# Patient Record
Sex: FEMALE | Race: WHITE | NOT HISPANIC OR LATINO | URBAN - METROPOLITAN AREA
[De-identification: names, ages, dates, MRNs, and addresses within clinical notes are randomized per-mention and may not be internally consistent; named-entity substitution may affect disease eponyms.]

---

## 2020-02-20 ENCOUNTER — APPOINTMENT (OUTPATIENT)
Age: 60
Setting detail: DERMATOLOGY
End: 2020-02-20

## 2020-02-20 VITALS — SYSTOLIC BLOOD PRESSURE: 118 MMHG | DIASTOLIC BLOOD PRESSURE: 72 MMHG

## 2020-02-20 DIAGNOSIS — L259 CONTACT DERMATITIS AND OTHER ECZEMA, UNSPECIFIED CAUSE: ICD-10-CM

## 2020-02-20 DIAGNOSIS — L82.1 OTHER SEBORRHEIC KERATOSIS: ICD-10-CM

## 2020-02-20 PROBLEM — L30.9 DERMATITIS, UNSPECIFIED: Status: ACTIVE | Noted: 2020-02-20

## 2020-02-20 PROCEDURE — 99201: CPT | Mod: 25

## 2020-02-20 PROCEDURE — ? COUNSELING

## 2020-02-20 PROCEDURE — 95044 PATCH/APPLICATION TESTS: CPT

## 2020-02-20 PROCEDURE — ? PATCH TESTING

## 2020-02-20 ASSESSMENT — LOCATION SIMPLE DESCRIPTION DERM
LOCATION SIMPLE: RIGHT CHEEK
LOCATION SIMPLE: RIGHT LIP

## 2020-02-20 ASSESSMENT — LOCATION DETAILED DESCRIPTION DERM
LOCATION DETAILED: RIGHT INFERIOR VERMILION LIP
LOCATION DETAILED: RIGHT MEDIAL MALAR CHEEK

## 2020-02-20 ASSESSMENT — LOCATION ZONE DERM
LOCATION ZONE: FACE
LOCATION ZONE: LIP

## 2020-02-20 NOTE — HPI: OTHER
Condition:: Chanpped lips
Please Describe Your Condition:: comes in for a chief complaint of Chanpped lips . Patient says she has Chapped Lips for 10 months.  Patient saw Dr. Mare Burnette in the past and was prescribed Hydrocortisone 2.5% and Ketoconazole Cream for a week which cleared her lips up.  She originally used it for one week, when she stopped it came back.  Patient was then told to use it for two more weeks, when she stopped, the condition returned.  Patient then was prescribed Terbinafine for 3 weeks which did not help at all.  Patient then was prescribed Mupirocin, which seemed to make the condition worse.  Patient says she did use Aqua Phor which seems to help somewhat.\\nNo help changing toothpaste. No other parts of the body involved.\\nIt burns and itches. Uses aquaphor on lips in past used blistex.\\nPatient notices that her lip worsens after drinking wine.\\nPatient states that there is also a scaly lesion on the right cheek.

## 2020-02-22 ENCOUNTER — APPOINTMENT (OUTPATIENT)
Age: 60
Setting detail: DERMATOLOGY
End: 2020-02-22

## 2020-02-22 DIAGNOSIS — L259 CONTACT DERMATITIS AND OTHER ECZEMA, UNSPECIFIED CAUSE: ICD-10-CM

## 2020-02-22 PROBLEM — L23.9 ALLERGIC CONTACT DERMATITIS, UNSPECIFIED CAUSE: Status: ACTIVE | Noted: 2020-02-22

## 2020-02-22 PROCEDURE — ? EXTENDED SERIES READING

## 2020-02-22 PROCEDURE — 99212 OFFICE O/P EST SF 10 MIN: CPT

## 2020-02-22 NOTE — PROCEDURE: EXTENDED SERIES READING
Allergen 193 Reaction: no reaction
Name Of Allergen 79: 2-ethylhexyl-4-methoxycinnamate 10%
Name Of Allergen 73: Amidoamine 0.1% aq
Allergen 10 Reaction: 1+
Name Of Allergen 82: Propyl gallate
Name Of Allergen 30: Budesonide 0.1% pet
What Reading Time Point?: 48 hour
Name Of Allergen 23: 2-Bromo-2-nitropopane-1,3 diol (Bronopol) 0.5%
Name Of Allergen 80: Benzyl alcohol 10%
Name Of Allergen 13: p-tert butylphenol formaldehyde resin
Name Of Allergen 63: Sorbic acid 2% pet
Name Of Allergen 4: Potassium dichromate 0.25% pet
Name Of Allergen 19: Methyldibromoglutaronititrile 0.5% pet Euxyl K400
Name Of Allergen 22: Mercapto mix 1% pet
Name Of Allergen 38: Iodopropynyl butylcarbamate 0.1% pet
Name Of Allergen 70: Ethylhexylglycerin 5% pet
Name Of Allergen 15: Carba mix 3% pet
Name Of Allergen 41: Mixed dialkyl thioureas 1% pet
Name Of Allergen 69: Cetyl stearyl alcohol 20% pet
Name Of Allergen 43: Hydroxyethyl methacrylate 2% pet
Name Of Allergen 60: Benzalkonium chloride 0.1% pet
Name Of Allergen 49: Tocopherol 100% (Vitamin E)
Name Of Allergen 55: Benzophenone-3, 10% pet
Name Of Allergen 9: Methylisothiazolinone 0.2% aq
Name Of Allergen 76: Disperse Orange 3, 1% pet
Name Of Allergen 72: Clobetasol-17-proprionate 1% pet
Name Of Allergen 16: Black Rubber Mix 0.6% pet
Name Of Allergen 7: Colophony 20% pet
Name Of Allergen 32: Mercaptobenzothiazole 1% pet
Name Of Allergen 51: Tea tree oil 5% pet
Name Of Allergen 77: Benzoic acid 5% pet
Name Of Allergen 58: Cocamide DENNY 0.5% pet
Name Of Allergen 27: Tixocortol-21-pivalate 1% pet
Show Negative Results In The Note?: Yes
Name Of Allergen 26: Benzocaine 5% pet
Name Of Allergen 74: Ethyl cycnoacrylate 10% pet
Name Of Allergen 10: Balsam of [place] 25% pet
Name Of Allergen 81: Polyethylene glycol
Name Of Allergen 54: Chloroxylenol (PCMX) 1% pet
Name Of Allergen 8: Paraben Mix 12% pet
Name Of Allergen 6: Fragrance mix I 8% pet
Number Of Patches Read: 80
Name Of Allergen 47: Lavender absolute 2% pet
Name Of Allergen 46: Methyl methacrylate 2% pet
Name Of Allergen 39: Polymyxin B sulfate 3% pet
Name Of Allergen 61: Benzophenone-4 2% pet
Name Of Allergen 67: Sorbitan sesquioleate 20% pet
Name Of Allergen 11: Ethylenediamine dihydrochloride 1%
Name Of Allergen 12: Cobalt chloride 1% pet
Name Of Allergen 24: Thiuram mix 1% pet
Name Of Allergen 18: Quaternium 15 2% pet
Name Of Allergen 2: Lanolin Alcohol 50% pet
Name Of Allergen 35: Disperse blue 106/124 mix 1.0% pet
Name Of Allergen 45: Decyl glucoside 5% pet
Name Of Allergen 20: p-Phenylenediamine 1% pet
Name Of Allergen 37: Propylene Glycol 30% aq
Name Of Allergen 68: n,n-diphenylguanidine 1% pet
Name Of Allergen 36: LIdocaine 15% pet
Name Of Allergen 21: Formaldehyde 1% aq
Name Of Allergen 65: Compositae mix II 5% pet
Detail Level: Detailed
Name Of Allergen 53: Propolis 10% pet
Name Of Allergen 64: Nneka 2% pet
Name Of Allergen 52: Chlorhexidine digluconate 0.5% aq
Name Of Allergen 75: Phenoxyethanol 1% pet
Name Of Allergen 48: Cinnamic aldehyde 1% pet
Name Of Allergen 59: 4-chloro-3-cresol (PCMC) 1% pet
Allergen 12 Counseling: Carba mix chemicals are used primarily in the production of rubber or latex products. Both natural and synthetic rubber may contain these agents, and sources include the followin. Gloves (household, work, or hospital) 2. Rubber shoes (sneakers, tennis shoes, and the like) 3. Leather shoes (insoles, adhesives,and linings) 4. Sponge makeup applicators and rubber eyelash curlers 5. Rubber in elasticized undergarments and clothing 6. Rubber pillows and sheets 7. Condoms and diaphragms 8. Medical devices 9. Swim wear 10. Tires 11. Renal dialysis equipment 12. Heavy rubber products used in industry 13. Rubber bands 14. Balloons and rubber toys  Other, nonrubber sources of exposure include the following products: 1. Disinfectants, repellents, fungicides, and insecticides used in agriculture 2. Adhesives, cement, sealants 3. Soaps and shampoos  Prevention If patients are carba sensitive and have foot dermatitis, it is probably due to their shoes. They may wear all leather shoes with no inner or outer sole, like moccasins. Molded plastic shoes or wooden clogs can be worn. Patients should contact their local shoe stores and ask for rubber-free shoes or contact an orthotic shop to have shoes custom-made (Box 5-2). If they cannot find such shoes, the insoles should be removed from leather shoes and insoles cut from piano felt, cork, or plastic should be inserted. Sweating should be minimized, and socks that may have absorbed allergens should be discarded.  Patients who are carba sensitive and have hand dermatitis should avoid rubber (latex) gloves and, if possible, wear vinyl gloves only (Box 5-3). If rubber gloves must be worn, manufacturers should be contacted, to acquire carba-free gloves. One important resource that can be contacted if the patient has persistent difficultiesis at the [employer], [employer] [address], [place], [place] [address]. Call [phone] or visit them at [email or web address].  Allergic patients should avoid contact with other rubber products as listed earlier and check the chemicals used in their work if they are in the agricultural industries. If such patients work with instruments with rubber tubes and hoses, the instruments should not be touched unless vinyl or carba-free rubber gloves are being worn.
Name Of Allergen 5: DMDM hydantoin 1% pet
Name Of Allergen 34: Fragrance mix II 14% pet
Name Of Allergen 44: Oleamidopropyl dimethylamine 0.1%
Name Of Allergen 57: Sesquiterpene lactone mix 0.1% pet
Name Of Allergen 31: Hydrocortisone-17-butyrate 1% pet
Name Of Allergen 14: Epoxy resin 1% pet
Name Of Allergen 17: Methylchlorisothiazolinone (Katya DOYLE)
Name Of Allergen 3: Neomycin 20% pet
Name Of Allergen 71: Triamcinolone 1% pet
Name Of Allergen 78: 2,6-Diter-butyl-4-cresol (BHT) 2%
Name Of Allergen 50: Ethyl acrylate 0.1% pet
Name Of Allergen 40: Cocamidopropyl betaine 1% aq
Name Of Allergen 33: Bacitracin 20% pet
Name Of Allergen 28: Gold sodium thiosulfate 2% pet
Name Of Allergen 62: Sodium benzoate 5% pet
Name Of Allergen 42: 3-Dimethylamino-propylamine (DMAPA) 1% aq
Name Of Allergen 25: Diazolidinyl urea 1% pet
Name Of Allergen 66: Ethyleneurea melamine-formaldehyde
Name Of Allergen 1: Nickel sulfate2.5% pet
Name Of Allergen 56: Tosylamide formaldehyde resin 10%
Name Of Allergen 29: Imidazolidinyl urea 2% pet

## 2020-02-24 ENCOUNTER — APPOINTMENT (OUTPATIENT)
Age: 60
Setting detail: DERMATOLOGY
End: 2020-02-24

## 2020-02-24 DIAGNOSIS — L259 CONTACT DERMATITIS AND OTHER ECZEMA, UNSPECIFIED CAUSE: ICD-10-CM

## 2020-02-24 PROBLEM — L23.9 ALLERGIC CONTACT DERMATITIS, UNSPECIFIED CAUSE: Status: ACTIVE | Noted: 2020-02-24

## 2020-02-24 PROCEDURE — ? EXTENDED SERIES READING

## 2020-02-24 PROCEDURE — 99212 OFFICE O/P EST SF 10 MIN: CPT

## 2020-02-24 PROCEDURE — ? COUNSELING

## 2020-02-24 NOTE — PROCEDURE: EXTENDED SERIES READING
Allergen 86 Reaction: no reaction
Name Of Allergen 21: Formaldehyde 1% aq
Allergen 10 Reaction: 1+
Name Of Allergen 10: Balsam of [place] 25% pet
Name Of Allergen 29: Imidazolidinyl urea 2% pet
Name Of Allergen 72: Clobetasol-17-proprionate 1% pet
Name Of Allergen 48: Cinnamic aldehyde 1% pet
Name Of Allergen 7: Colophony 20% pet
Name Of Allergen 9: Methylisothiazolinone 0.2% aq
Name Of Allergen 50: Ethyl acrylate 0.1% pet
Name Of Allergen 57: Sesquiterpene lactone mix 0.1% pet
Name Of Allergen 44: Oleamidopropyl dimethylamine 0.1%
Name Of Allergen 75: Phenoxyethanol 1% pet
Detail Level: Detailed
Name Of Allergen 16: Black Rubber Mix 0.6% pet
Name Of Allergen 12: Cobalt chloride 1% pet
Show Negative Results In The Note?: Yes
Name Of Allergen 13: p-tert butylphenol formaldehyde resin
Number Of Patches Read: 78
Name Of Allergen 55: Benzophenone-3, 10% pet
Name Of Allergen 1: Nickel sulfate2.5% pet
Name Of Allergen 49: Tocopherol 100% (Vitamin E)
Name Of Allergen 82: Propyl gallate
Name Of Allergen 65: Compositae mix II 5% pet
Name Of Allergen 19: Methyldibromoglutaronititrile 0.5% pet Euxyl K400
Name Of Allergen 34: Fragrance mix II 14% pet
Name Of Allergen 46: Methyl methacrylate 2% pet
Name Of Allergen 81: Polyethylene glycol
Name Of Allergen 73: Amidoamine 0.1% aq
Name Of Allergen 42: 3-Dimethylamino-propylamine (DMAPA) 1% aq
Name Of Allergen 41: Mixed dialkyl thioureas 1% pet
Name Of Allergen 71: Triamcinolone 1% pet
Name Of Allergen 62: Sodium benzoate 5% pet
Name Of Allergen 3: Neomycin 20% pet
Name Of Allergen 26: Benzocaine 5% pet
Name Of Allergen 59: 4-chloro-3-cresol (PCMC) 1% pet
Name Of Allergen 22: Mercapto mix 1% pet
Name Of Allergen 33: Bacitracin 20% pet
Name Of Allergen 36: LIdocaine 15% pet
Name Of Allergen 14: Epoxy resin 1% pet
Name Of Allergen 27: Tixocortol-21-pivalate 1% pet
Name Of Allergen 18: Quaternium 15 2% pet
Name Of Allergen 52: Chlorhexidine digluconate 0.5% aq
Name Of Allergen 24: Thiuram mix 1% pet
Name Of Allergen 6: Fragrance mix I 8% pet
What Reading Time Point?: 96 hour
Name Of Allergen 37: Propylene Glycol 30% aq
Name Of Allergen 15: Carba mix 3% pet
Name Of Allergen 68: n,n-diphenylguanidine 1% pet
Name Of Allergen 25: Diazolidinyl urea 1% pet
Name Of Allergen 63: Sorbic acid 2% pet
Name Of Allergen 23: 2-Bromo-2-nitropopane-1,3 diol (Bronopol) 0.5%
Name Of Allergen 60: Benzalkonium chloride 0.1% pet
Name Of Allergen 38: Iodopropynyl butylcarbamate 0.1% pet
Name Of Allergen 47: Lavender absolute 2% pet
Name Of Allergen 39: Polymyxin B sulfate 3% pet
Name Of Allergen 43: Hydroxyethyl methacrylate 2% pet
Name Of Allergen 80: Benzyl alcohol 10%
Name Of Allergen 28: Gold sodium thiosulfate 2% pet
Name Of Allergen 45: Decyl glucoside 5% pet
Name Of Allergen 67: Sorbitan sesquioleate 20% pet
Name Of Allergen 32: Mercaptobenzothiazole 1% pet
Name Of Allergen 56: Tosylamide formaldehyde resin 10%
Name Of Allergen 31: Hydrocortisone-17-butyrate 1% pet
Name Of Allergen 4: Potassium dichromate 0.25% pet
Name Of Allergen 17: Methylchlorisothiazolinone (Katya DOYLE)
Name Of Allergen 11: Ethylenediamine dihydrochloride 1%
Name Of Allergen 74: Ethyl cycnoacrylate 10% pet
Name Of Allergen 51: Tea tree oil 5% pet
Name Of Allergen 77: Benzoic acid 5% pet
Name Of Allergen 61: Benzophenone-4 2% pet
Name Of Allergen 64: Nneka 2% pet
Name Of Allergen 2: Lanolin Alcohol 50% pet
Name Of Allergen 20: p-Phenylenediamine 1% pet
Name Of Allergen 58: Cocamide DENNY 0.5% pet
Name Of Allergen 53: Propolis 10% pet
Name Of Allergen 76: Disperse Orange 3, 1% pet
Name Of Allergen 66: Ethyleneurea melamine-formaldehyde
Name Of Allergen 70: Ethylhexylglycerin 5% pet
Name Of Allergen 69: Cetyl stearyl alcohol 20% pet
Name Of Allergen 35: Disperse blue 106/124 mix 1.0% pet
Name Of Allergen 30: Budesonide 0.1% pet
Name Of Allergen 54: Chloroxylenol (PCMX) 1% pet
Name Of Allergen 8: Paraben Mix 12% pet
Name Of Allergen 5: DMDM hydantoin 1% pet
Name Of Allergen 40: Cocamidopropyl betaine 1% aq
Name Of Allergen 78: 2,6-Diter-butyl-4-cresol (BHT) 2%
Name Of Allergen 79: 2-ethylhexyl-4-methoxycinnamate 10%
Allergen 12 Counseling: Carba mix chemicals are used primarily in the production of rubber or latex products. Both natural and synthetic rubber may contain these agents, and sources include the followin. Gloves (household, work, or hospital) 2. Rubber shoes (sneakers, tennis shoes, and the like) 3. Leather shoes (insoles, adhesives,and linings) 4. Sponge makeup applicators and rubber eyelash curlers 5. Rubber in elasticized undergarments and clothing 6. Rubber pillows and sheets 7. Condoms and diaphragms 8. Medical devices 9. Swim wear 10. Tires 11. Renal dialysis equipment 12. Heavy rubber products used in industry 13. Rubber bands 14. Balloons and rubber toys  Other, nonrubber sources of exposure include the following products: 1. Disinfectants, repellents, fungicides, and insecticides used in agriculture 2. Adhesives, cement, sealants 3. Soaps and shampoos  Prevention If patients are carba sensitive and have foot dermatitis, it is probably due to their shoes. They may wear all leather shoes with no inner or outer sole, like moccasins. Molded plastic shoes or wooden clogs can be worn. Patients should contact their local shoe stores and ask for rubber-free shoes or contact an orthotic shop to have shoes custom-made (Box 5-2). If they cannot find such shoes, the insoles should be removed from leather shoes and insoles cut from piano felt, cork, or plastic should be inserted. Sweating should be minimized, and socks that may have absorbed allergens should be discarded.  Patients who are carba sensitive and have hand dermatitis should avoid rubber (latex) gloves and, if possible, wear vinyl gloves only (Box 5-3). If rubber gloves must be worn, manufacturers should be contacted, to acquire carba-free gloves. One important resource that can be contacted if the patient has persistent difficultiesis at the [employer], [employer] [address], [place], [place] [address]. Call [phone] or visit them at [email or web address].  Allergic patients should avoid contact with other rubber products as listed earlier and check the chemicals used in their work if they are in the agricultural industries. If such patients work with instruments with rubber tubes and hoses, the instruments should not be touched unless vinyl or carba-free rubber gloves are being worn.

## 2020-04-29 ENCOUNTER — APPOINTMENT (OUTPATIENT)
Age: 60
Setting detail: DERMATOLOGY
End: 2020-04-29

## 2020-04-29 DIAGNOSIS — L259 CONTACT DERMATITIS AND OTHER ECZEMA, UNSPECIFIED CAUSE: ICD-10-CM | Status: IMPROVED

## 2020-04-29 PROBLEM — L23.9 ALLERGIC CONTACT DERMATITIS, UNSPECIFIED CAUSE: Status: ACTIVE | Noted: 2020-04-29

## 2020-04-29 PROCEDURE — ? TREATMENT REGIMEN

## 2020-04-29 PROCEDURE — ? COUNSELING

## 2020-04-29 PROCEDURE — ? OBSERVATION

## 2020-04-29 PROCEDURE — 99213 OFFICE O/P EST LOW 20 MIN: CPT | Mod: 95

## 2020-04-29 ASSESSMENT — LOCATION SIMPLE DESCRIPTION DERM: LOCATION SIMPLE: LEFT LIP

## 2020-04-29 ASSESSMENT — LOCATION DETAILED DESCRIPTION DERM: LOCATION DETAILED: LEFT UPPER CUTANEOUS LIP

## 2020-04-29 ASSESSMENT — LOCATION ZONE DERM: LOCATION ZONE: LIP

## 2020-11-17 ENCOUNTER — APPOINTMENT (OUTPATIENT)
Age: 60
Setting detail: DERMATOLOGY
End: 2020-11-17

## 2020-11-17 DIAGNOSIS — L82.0 INFLAMED SEBORRHEIC KERATOSIS: ICD-10-CM

## 2020-11-17 DIAGNOSIS — L259 CONTACT DERMATITIS AND OTHER ECZEMA, UNSPECIFIED CAUSE: ICD-10-CM | Status: WELL CONTROLLED

## 2020-11-17 PROBLEM — L23.9 ALLERGIC CONTACT DERMATITIS, UNSPECIFIED CAUSE: Status: ACTIVE | Noted: 2020-11-17

## 2020-11-17 PROBLEM — D48.5 NEOPLASM OF UNCERTAIN BEHAVIOR OF SKIN: Status: ACTIVE | Noted: 2020-11-17

## 2020-11-17 PROBLEM — D23.5 OTHER BENIGN NEOPLASM OF SKIN OF TRUNK: Status: ACTIVE | Noted: 2020-11-17

## 2020-11-17 PROCEDURE — ? COUNSELING

## 2020-11-17 PROCEDURE — 11102 TANGNTL BX SKIN SINGLE LES: CPT

## 2020-11-17 PROCEDURE — ? OBSERVATION

## 2020-11-17 PROCEDURE — ? BIOPSY BY SHAVE METHOD

## 2020-11-17 PROCEDURE — 99213 OFFICE O/P EST LOW 20 MIN: CPT | Mod: 25

## 2020-11-17 ASSESSMENT — LOCATION SIMPLE DESCRIPTION DERM
LOCATION SIMPLE: SCALP
LOCATION SIMPLE: LEFT LIP

## 2020-11-17 ASSESSMENT — LOCATION ZONE DERM
LOCATION ZONE: LIP
LOCATION ZONE: SCALP

## 2020-11-17 ASSESSMENT — LOCATION DETAILED DESCRIPTION DERM
LOCATION DETAILED: LEFT SUPERIOR PARIETAL SCALP
LOCATION DETAILED: LEFT LOWER CUTANEOUS LIP

## 2020-11-17 NOTE — PROCEDURE: BIOPSY BY SHAVE METHOD
Validate Note Data (See Information Below): Yes
Depth Of Biopsy: dermis
Validate That Anesthesia Is Not 0: No
Hemostasis: Drysol
Silver Nitrate Text: The wound bed was treated with silver nitrate after the biopsy was performed.
Anesthesia Type: 1% lidocaine with epinephrine
Electrodesiccation And Curettage Text: The wound bed was treated with electrodesiccation and curettage after the biopsy was performed.
Billing Type: Third-Party Bill
Wound Care: Petrolatum
Type Of Destruction Used: Curettage
Electrodesiccation Text: The wound bed was treated with electrodesiccation after the biopsy was performed.
Biopsy Type: H and E
Cryotherapy Text: The wound bed was treated with cryotherapy after the biopsy was performed.
X Size Of Lesion In Cm: 0.4
Dressing: bandage
Anesthesia Volume In Cc: 0.5
Curettage Text: The wound bed was treated with curettage after the biopsy was performed.
Detail Level: Detailed
Notification Instructions: Patient will be notified of biopsy results. However, patient instructed to call the office if not contacted within 2 weeks.
Information: Selecting Yes will display possible errors in your note based on the variables you have selected. This validation is only offered as a suggestion for you. PLEASE NOTE THAT THE VALIDATION TEXT WILL BE REMOVED WHEN YOU FINALIZE YOUR NOTE. IF YOU WANT TO FAX A PRELIMINARY NOTE YOU WILL NEED TO TOGGLE THIS TO 'NO' IF YOU DO NOT WANT IT IN YOUR FAXED NOTE.
Consent: Written consent was obtained and risks were reviewed including but not limited to scarring, infection, bleeding, scabbing, incomplete removal, nerve damage and allergy to anesthesia.
Biopsy Method: 15 blade
Post-Care Instructions: I reviewed with the patient in detail post-care instructions. Patient is to keep the biopsy site dry overnight, and then apply bacitracin twice daily until healed. Patient may apply hydrogen peroxide soaks to remove any crusting.
Additional Anesthesia Volume In Cc (Will Not Render If 0): 0

## 2022-02-07 ENCOUNTER — APPOINTMENT (OUTPATIENT)
Age: 62
Setting detail: DERMATOLOGY
End: 2022-02-07

## 2022-02-07 VITALS — DIASTOLIC BLOOD PRESSURE: 72 MMHG | SYSTOLIC BLOOD PRESSURE: 104 MMHG

## 2022-02-07 DIAGNOSIS — L82.1 OTHER SEBORRHEIC KERATOSIS: ICD-10-CM

## 2022-02-07 DIAGNOSIS — L259 CONTACT DERMATITIS AND OTHER ECZEMA, UNSPECIFIED CAUSE: ICD-10-CM | Status: STABLE

## 2022-02-07 PROBLEM — L23.9 ALLERGIC CONTACT DERMATITIS, UNSPECIFIED CAUSE: Status: ACTIVE | Noted: 2022-02-07

## 2022-02-07 PROBLEM — D23.5 OTHER BENIGN NEOPLASM OF SKIN OF TRUNK: Status: ACTIVE | Noted: 2022-02-07

## 2022-02-07 PROCEDURE — ? RECOMMENDATIONS

## 2022-02-07 PROCEDURE — 99213 OFFICE O/P EST LOW 20 MIN: CPT

## 2022-02-07 PROCEDURE — ? COUNSELING

## 2022-02-07 PROCEDURE — ? OBSERVATION

## 2022-02-07 ASSESSMENT — LOCATION DETAILED DESCRIPTION DERM: LOCATION DETAILED: RIGHT VENTRAL PROXIMAL FOREARM

## 2022-02-07 ASSESSMENT — LOCATION ZONE DERM: LOCATION ZONE: ARM

## 2022-02-07 ASSESSMENT — LOCATION SIMPLE DESCRIPTION DERM: LOCATION SIMPLE: RIGHT FOREARM

## 2022-02-07 NOTE — PROCEDURE: COUNSELING
Detail Level: Detailed
Patient Specific Counseling (Will Not Stick From Patient To Patient): She will switch to a cloth facial mask as much as possible.

## 2022-02-07 NOTE — PROCEDURE: RECOMMENDATIONS
Detail Level: Zone
Render Risk Assessment In Note?: no
Recommendations (Free Text): New. Camp list sent. \\nFollow camp list \\nTopical steroids as needed for flares \\nCall sooner if concerns
Recommendation Preamble: The following recommendations were made during the visit:

## 2023-02-20 ENCOUNTER — APPOINTMENT (OUTPATIENT)
Age: 63
Setting detail: DERMATOLOGY
End: 2023-02-20

## 2023-02-20 DIAGNOSIS — L259 CONTACT DERMATITIS AND OTHER ECZEMA, UNSPECIFIED CAUSE: ICD-10-CM | Status: WELL CONTROLLED

## 2023-02-20 PROBLEM — L23.9 ALLERGIC CONTACT DERMATITIS, UNSPECIFIED CAUSE: Status: ACTIVE | Noted: 2023-02-20

## 2023-02-20 PROBLEM — D23.62 OTHER BENIGN NEOPLASM OF SKIN OF LEFT UPPER LIMB, INCLUDING SHOULDER: Status: ACTIVE | Noted: 2023-02-20

## 2023-02-20 PROCEDURE — ? OBSERVATION

## 2023-02-20 PROCEDURE — 99213 OFFICE O/P EST LOW 20 MIN: CPT

## 2023-02-20 PROCEDURE — ? COUNSELING

## 2023-02-20 ASSESSMENT — LOCATION SIMPLE DESCRIPTION DERM: LOCATION SIMPLE: LEFT CHEEK

## 2023-02-20 ASSESSMENT — LOCATION ZONE DERM: LOCATION ZONE: FACE

## 2023-02-20 ASSESSMENT — LOCATION DETAILED DESCRIPTION DERM: LOCATION DETAILED: LEFT INFERIOR CENTRAL MALAR CHEEK

## 2023-02-20 NOTE — PROCEDURE: OBSERVATION
Size Of Lesion In Cm (Optional): 0
Detail Level: Generalized
Detail Level: Zone
Body Location Override (Optional - Billing Will Still Be Based On Selected Body Map Location If Applicable): face

## 2024-01-03 NOTE — PROCEDURE: TREATMENT REGIMEN
Informed must fast no call back required. Orders to  [facility]    Future Appointments   Date Time Provider Department Center   1/16/2024  7:30 AM Say Ravi MD [facility]      
Labs placed per protocol.  
Plan: Okay to try previous products, but introduce slowly (one item at a time)
Detail Level: Zone
Continue Regimen: Adherence to CAMP list

## 2024-02-21 ENCOUNTER — APPOINTMENT (OUTPATIENT)
Age: 64
Setting detail: DERMATOLOGY
End: 2024-02-21

## 2024-02-21 DIAGNOSIS — B35.1 TINEA UNGUIUM: ICD-10-CM

## 2024-02-21 DIAGNOSIS — L57.0 ACTINIC KERATOSIS: ICD-10-CM

## 2024-02-21 DIAGNOSIS — L259 CONTACT DERMATITIS AND OTHER ECZEMA, UNSPECIFIED CAUSE: ICD-10-CM | Status: WELL CONTROLLED

## 2024-02-21 DIAGNOSIS — L81.4 OTHER MELANIN HYPERPIGMENTATION: ICD-10-CM

## 2024-02-21 PROBLEM — L23.9 ALLERGIC CONTACT DERMATITIS, UNSPECIFIED CAUSE: Status: ACTIVE | Noted: 2024-02-21

## 2024-02-21 PROBLEM — D23.62 OTHER BENIGN NEOPLASM OF SKIN OF LEFT UPPER LIMB, INCLUDING SHOULDER: Status: ACTIVE | Noted: 2024-02-21

## 2024-02-21 PROCEDURE — ? OBSERVATION

## 2024-02-21 PROCEDURE — 17000 DESTRUCT PREMALG LESION: CPT

## 2024-02-21 PROCEDURE — 99213 OFFICE O/P EST LOW 20 MIN: CPT | Mod: 25

## 2024-02-21 PROCEDURE — ? LIQUID NITROGEN

## 2024-02-21 PROCEDURE — ? COUNSELING

## 2024-02-21 ASSESSMENT — LOCATION DETAILED DESCRIPTION DERM
LOCATION DETAILED: NASAL ROOT
LOCATION DETAILED: LEFT GREAT TOENAIL

## 2024-02-21 ASSESSMENT — LOCATION SIMPLE DESCRIPTION DERM
LOCATION SIMPLE: NOSE
LOCATION SIMPLE: LEFT GREAT TOE

## 2024-02-21 ASSESSMENT — LOCATION ZONE DERM
LOCATION ZONE: NOSE
LOCATION ZONE: TOENAIL

## 2024-02-21 NOTE — PROCEDURE: COUNSELING
Detail Level: Detailed
Topical Retinoids Recommendations: Start every other night, then nightly, pea size amt to face. Rx printed \\nIf would like further treatment recommended laser
Detail Level: Generalized

## 2024-02-21 NOTE — PROCEDURE: LIQUID NITROGEN
Render Post-Care Instructions In Note?: no
Show Applicator Variable?: Yes
Duration Of Freeze Thaw-Cycle (Seconds): 20
Post-Care Instructions: I reviewed with the patient in detail post-care instructions. Patient is to wear sunprotection, and avoid picking at any of the treated lesions. Pt may apply Vaseline to crusted or scabbing areas.
Number Of Freeze-Thaw Cycles: 1 freeze-thaw cycle
Detail Level: Detailed
Consent: The patient's consent was obtained including but not limited to risks of crusting, scabbing, blistering, scarring, darker or lighter pigmentary change, recurrence, incomplete removal and infection.

## 2025-03-12 ENCOUNTER — APPOINTMENT (OUTPATIENT)
Age: 65
Setting detail: DERMATOLOGY
End: 2025-03-12

## 2025-03-12 DIAGNOSIS — L82.1 OTHER SEBORRHEIC KERATOSIS: ICD-10-CM

## 2025-03-12 DIAGNOSIS — D18.0 HEMANGIOMA: ICD-10-CM

## 2025-03-12 DIAGNOSIS — L259 CONTACT DERMATITIS AND OTHER ECZEMA, UNSPECIFIED CAUSE: ICD-10-CM | Status: WELL CONTROLLED

## 2025-03-12 DIAGNOSIS — L71.8 OTHER ROSACEA: ICD-10-CM | Status: INADEQUATELY CONTROLLED

## 2025-03-12 DIAGNOSIS — L81.4 OTHER MELANIN HYPERPIGMENTATION: ICD-10-CM

## 2025-03-12 DIAGNOSIS — D22 MELANOCYTIC NEVI: ICD-10-CM

## 2025-03-12 PROBLEM — D22.9 MELANOCYTIC NEVI, UNSPECIFIED: Status: ACTIVE | Noted: 2025-03-12

## 2025-03-12 PROBLEM — L23.9 ALLERGIC CONTACT DERMATITIS, UNSPECIFIED CAUSE: Status: ACTIVE | Noted: 2025-03-12

## 2025-03-12 PROBLEM — D18.01 HEMANGIOMA OF SKIN AND SUBCUTANEOUS TISSUE: Status: ACTIVE | Noted: 2025-03-12

## 2025-03-12 PROCEDURE — ? TREATMENT REGIMEN

## 2025-03-12 PROCEDURE — ? PRESCRIPTION

## 2025-03-12 PROCEDURE — ? COUNSELING

## 2025-03-12 PROCEDURE — 99204 OFFICE O/P NEW MOD 45 MIN: CPT

## 2025-03-12 PROCEDURE — ? OBSERVATION

## 2025-03-12 RX ORDER — METRONIDAZOLE 7.5 MG/G
CREAM TOPICAL
Qty: 90 | Refills: 3 | Status: ERX | COMMUNITY
Start: 2025-03-12

## 2025-03-12 RX ADMIN — METRONIDAZOLE: 7.5 CREAM TOPICAL at 00:00

## 2025-03-12 ASSESSMENT — LOCATION SIMPLE DESCRIPTION DERM: LOCATION SIMPLE: RIGHT CHEEK

## 2025-03-12 ASSESSMENT — LOCATION ZONE DERM: LOCATION ZONE: FACE

## 2025-03-12 ASSESSMENT — LOCATION DETAILED DESCRIPTION DERM: LOCATION DETAILED: RIGHT CENTRAL MALAR CHEEK

## 2025-06-24 NOTE — PROCEDURE: OBSERVATION
Detail Level: Generalized
Detail Level: Zone
Body Location Override (Optional - Billing Will Still Be Based On Selected Body Map Location If Applicable): toenails
X Size Of Lesion In Cm (Optional): 0
Chloraprep